# Patient Record
Sex: FEMALE | Race: WHITE | NOT HISPANIC OR LATINO | Employment: UNEMPLOYED | URBAN - METROPOLITAN AREA
[De-identification: names, ages, dates, MRNs, and addresses within clinical notes are randomized per-mention and may not be internally consistent; named-entity substitution may affect disease eponyms.]

---

## 2017-11-01 ENCOUNTER — OFFICE VISIT (OUTPATIENT)
Dept: FAMILY MEDICINE | Facility: CLINIC | Age: 42
End: 2017-11-01
Payer: MEDICARE

## 2017-11-01 ENCOUNTER — TELEPHONE (OUTPATIENT)
Dept: FAMILY MEDICINE | Facility: CLINIC | Age: 42
End: 2017-11-01

## 2017-11-01 VITALS
RESPIRATION RATE: 18 BRPM | OXYGEN SATURATION: 98 % | HEIGHT: 72 IN | SYSTOLIC BLOOD PRESSURE: 144 MMHG | BODY MASS INDEX: 32.94 KG/M2 | HEART RATE: 55 BPM | DIASTOLIC BLOOD PRESSURE: 92 MMHG | WEIGHT: 243.19 LBS

## 2017-11-01 DIAGNOSIS — I10 ESSENTIAL HYPERTENSION: Primary | ICD-10-CM

## 2017-11-01 DIAGNOSIS — E03.4 HYPOTHYROIDISM DUE TO ACQUIRED ATROPHY OF THYROID: ICD-10-CM

## 2017-11-01 DIAGNOSIS — G89.29 OTHER CHRONIC PAIN: ICD-10-CM

## 2017-11-01 DIAGNOSIS — F31.9 BIPOLAR 1 DISORDER: ICD-10-CM

## 2017-11-01 PROCEDURE — 99204 OFFICE O/P NEW MOD 45 MIN: CPT | Mod: S$PBB,,, | Performed by: INTERNAL MEDICINE

## 2017-11-01 PROCEDURE — 99999 PR PBB SHADOW E&M-NEW PATIENT-LVL IV: CPT | Mod: PBBFAC,,, | Performed by: INTERNAL MEDICINE

## 2017-11-01 PROCEDURE — 99204 OFFICE O/P NEW MOD 45 MIN: CPT | Mod: PBBFAC,PO | Performed by: INTERNAL MEDICINE

## 2017-11-01 RX ORDER — IBUPROFEN 200 MG
800 TABLET ORAL 3 TIMES DAILY
COMMUNITY

## 2017-11-01 RX ORDER — OXYCODONE HYDROCHLORIDE 15 MG/1
15 TABLET, FILM COATED, EXTENDED RELEASE ORAL 2 TIMES DAILY PRN
COMMUNITY
End: 2019-01-10

## 2017-11-01 RX ORDER — LOSARTAN POTASSIUM 50 MG/1
50 TABLET ORAL DAILY
Qty: 30 TABLET | Refills: 8 | Status: SHIPPED | OUTPATIENT
Start: 2017-11-01 | End: 2019-01-10

## 2017-11-01 RX ORDER — RISPERIDONE 1 MG/1
1 TABLET ORAL NIGHTLY
Qty: 30 TABLET | Refills: 11 | Status: SHIPPED | OUTPATIENT
Start: 2017-11-01 | End: 2019-01-10

## 2017-11-01 RX ORDER — VENLAFAXINE HYDROCHLORIDE 75 MG/1
75 CAPSULE, EXTENDED RELEASE ORAL DAILY
Qty: 30 CAPSULE | Refills: 11 | Status: SHIPPED | OUTPATIENT
Start: 2017-11-01 | End: 2019-01-10

## 2017-11-01 RX ORDER — LEVOTHYROXINE SODIUM 150 UG/1
150 TABLET ORAL DAILY
Qty: 30 TABLET | Refills: 8 | Status: SHIPPED | OUTPATIENT
Start: 2017-11-01 | End: 2019-01-11

## 2017-11-01 RX ORDER — MULTIVITAMIN
1 TABLET ORAL DAILY
COMMUNITY

## 2017-11-01 NOTE — PROGRESS NOTES
Subjective:       Patient ID: Minerva Sandoval is a 42 y.o. female.    Chief Complaint: Establish Care; Medication Refill; Hypothyroidism; Hypertension; and Depression Screen (25)    New patient out of her meds:    Bipolar 2 - mild uncontrolled depression.  Was managed by Dr Laurence Diez, psychology.  Off meds bc they don't take her insurance.  Needs referral. Was on Effexor 75 mg, resperidone 4 mg - was taking 1 mg bc of hang over next day.     LUIS- hydroxyzine not working.      HTN - uncontrolled; lisinopril 20 mg     Chronic pain - takes occasional 15-30 mg oxycodone.  Takes 12 - 16 ibuprofen per day.  Advised not to take this much.      Hypothyroid - 150 mg levothyroxine. Off her meds.     Out of all her meds for a while.       Review of Systems   Constitutional: Negative for appetite change and fever.   HENT: Negative for nosebleeds and trouble swallowing.    Eyes: Negative for discharge and visual disturbance.   Respiratory: Negative for choking and shortness of breath.    Cardiovascular: Negative for chest pain and palpitations.   Gastrointestinal: Negative for abdominal pain, nausea and vomiting.   Musculoskeletal: Negative for arthralgias and joint swelling.   Skin: Negative for rash and wound.   Neurological: Negative for dizziness and syncope.   Psychiatric/Behavioral: Positive for dysphoric mood. Negative for confusion and suicidal ideas.       Objective:      Vitals:    11/01/17 0958   BP: (!) 144/92   Pulse: (!) 55   Resp: 18     Physical Exam   Constitutional: She appears well-nourished.   Eyes: Conjunctivae and EOM are normal.   Neck: Trachea normal and normal range of motion. No thyromegaly present.   Cardiovascular: Normal rate, regular rhythm and normal heart sounds.    Edema negative   Pulmonary/Chest: Effort normal and breath sounds normal.   Abdominal: Soft. There is no hepatomegaly.   Musculoskeletal:   Normal ROM bilateral    Neurological: She has normal reflexes. No cranial nerve deficit  "(grossly intact).   Skin: Skin is warm, dry and intact.   Psychiatric: She has a normal mood and affect.   Alert and orientated   Vitals reviewed.        Assessment:       1. Essential hypertension    2. Hypothyroidism due to acquired atrophy of thyroid    3. Bipolar 1 disorder    4. Other chronic pain        Plan:       Essential hypertension  -     losartan (COZAAR) 50 MG tablet; Take 1 tablet (50 mg total) by mouth once daily.  Dispense: 30 tablet; Refill: 8  -     Comprehensive metabolic panel; Future; Expected date: 01/01/2018  -     Lipid panel; Future; Expected date: 01/01/2018  -     Comprehensive metabolic panel; Future; Expected date: 06/30/2018    Hypothyroidism due to acquired atrophy of thyroid  -     levothyroxine (SYNTHROID) 150 MCG tablet; Take 1 tablet (150 mcg total) by mouth once daily.  Dispense: 30 tablet; Refill: 8  -     TSH; Future; Expected date: 01/01/2018  -     TSH; Future; Expected date: 06/30/2018    Bipolar 1 disorder  -     Cancel: Ambulatory consult to Psychiatry  -     venlafaxine (EFFEXOR-XR) 75 MG 24 hr capsule; Take 1 capsule (75 mg total) by mouth once daily.  Dispense: 30 capsule; Refill: 11  -     risperiDONE (RISPERDAL) 1 MG tablet; Take 1 tablet (1 mg total) by mouth every evening.  Dispense: 30 tablet; Refill: 11  -     Ambulatory consult to Psychiatry    Other chronic pain  -     Ambulatory consult to Pain Clinic            Counseled on regular exercise, maintenance of a healthy weight, balanced diet rich in fruits/vegetables and lean protein, and avoidance of unhealthy habits like smoking and excessive alcohol intake.   Also, counseled on importance of being compliant with medication, health appointments, diet and exercise.     Return in about 8 months (around 7/1/2018).    "This note will not be shared with the patient."  "

## 2017-12-05 NOTE — PROGRESS NOTES
[facility] New Patient Evaluation    Referred by: Rafael  Reason for referral: Other Chronic Pain    CC:   Chief Complaint   Patient presents with    Back Pain    Neck Pain     HPI: Minerva Sandoval is a pleasant 42 y.o. female who presented to my clinic complaining of low back pain as characterized below.  Chart review is significant for evaluation in the emergency department on 4/3/17 when she was brought in by a deputy concerned for her mental health.  She has an associated history of bipolar 1 disorder and was previously on medications to control it.  She recently established care in the [facility] system with Dr. Armand Hall who is assisting her with the management of hypertension, hypothyroidism, generalized anxiety disorder, and depression.  Dr. Hall referred her to psychiatry which is pending.    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      Previous Therapies:  PT: Denies  HEP: Denies  TENS: Yes, didn't help  Injections:    - Lumbar RFA March 2017 - 20% relief  Surgery: None on neck or back  Medications:    - NSAIDS: Currently taking Ibuprofen   - MSK Relaxants: Flexeril   - TCAs:    - SNRIs: Currently taking venlafaxine   - Topicals:    - Anticonvulsants: Gabapentin - helped nerve pain in leg but not back and also made her tired   - Opioids: previously on Oxycontin 15 BID    Current Pain Medications:  1. Venlafaxine XR 75 mg  daily  2. Ibuprofen 800 TID     Full Medication List:    Current Outpatient Prescriptions:     CALCIUM CARBONATE (CALCIUM 300 ORAL), Take 1 tablet by mouth once daily. Pt does not know dose, Disp: , Rfl:     ERGOCALCIFEROL, VITAMIN D2, (VITAMIN D ORAL), Take 1 capsule by mouth once daily. Pt is unsure of dose., Disp: , Rfl:     ibuprofen (ADVIL,MOTRIN) 200 MG tablet, Take 800 mg by mouth 3 (three) times daily., Disp: , Rfl:     levothyroxine (SYNTHROID) 150 MCG tablet, Take 1 tablet (150 mcg total) by mouth once daily., Disp: 30 tablet, Rfl: 8    losartan (COZAAR) 50 MG tablet, Take 1 tablet (50 mg total) by mouth once daily., Disp: 30 tablet, Rfl: 8    multivitamin (ONE DAILY MULTIVITAMIN) per tablet, Take 1 tablet by mouth once daily., Disp: , Rfl:     oxyCODONE (OXYCONTIN) 15 mg TR12 12 hr tablet, Take 15 mg by mouth 2 (two) times daily as needed., Disp: , Rfl:     venlafaxine (EFFEXOR-XR) 75 MG 24 hr capsule, Take 1 capsule (75 mg total) by mouth once daily., Disp: 30 capsule, Rfl: 11    risperiDONE (RISPERDAL) 1 MG tablet, Take 1 tablet (1 mg total) by mouth every evening., Disp: 30 tablet, Rfl: 11     Review of Systems:  Review of Systems   Constitutional: Negative for chills and fever.   HENT: Negative for nosebleeds.    Eyes: Negative for pain.   Respiratory: Negative for hemoptysis.    Cardiovascular: Negative for chest pain.   Gastrointestinal: Negative for nausea and vomiting.   Genitourinary: Negative for dysuria.   Musculoskeletal: Positive for back pain and neck pain.   Skin: Negative for rash.   Endo/Heme/Allergies: Does not bruise/bleed easily.       Allergies:  Pineapple     Medical History:  Past Medical History:   Diagnosis Date    Bipolar 1 disorder     Depression     Hypertension     Thyroid disease         Surgical History:  Past Surgical History:   Procedure Laterality Date     SECTION      EYE SURGERY          Social History:  Social History     Social History     "Marital status:      Spouse name: N/A    Number of children: N/A    Years of education: N/A     Occupational History    Not on file.     Social History Main Topics    Smoking status: Current Every Day Smoker    Smokeless tobacco: Never Used    Alcohol use No    Drug use: Unknown    Sexual activity: Not on file     Other Topics Concern    Not on file     Social History Narrative    No narrative on file       Physical Exam:  Vitals:    12/06/17 1301   BP: (!) 150/90   Pulse: 78   Weight: 104.7 kg (230 lb 13.2 oz)   Height: 6' 4" (1.93 m)   PainSc:   6   PainLoc: Generalized     General    Nursing note and vitals reviewed.  Constitutional: She is oriented to person, place, and time. She appears well-developed and well-nourished. No distress.   HENT:   Head: Normocephalic and atraumatic.   Nose: Nose normal.   Eyes: Conjunctivae and EOM are normal. Pupils are equal, round, and reactive to light. Right eye exhibits no discharge. Left eye exhibits no discharge. No scleral icterus.   Neck: No JVD present.   Cardiovascular: Intact distal pulses.    Pulmonary/Chest: Effort normal. No respiratory distress.   Abdominal: She exhibits no distension.   Neurological: She is alert and oriented to person, place, and time. Coordination normal.   Psychiatric: She has a normal mood and affect. Her behavior is normal. Judgment and thought content normal.     General Musculoskeletal Exam   Gait: normal         Right Hip Exam     Tests   Pain w/ forced internal rotation (SHERYL): absent  Left Hip Exam     Tests   Pain w/ forced internal rotation (SHERYL): present      Back (L-Spine & T-Spine) / Neck (C-Spine) Exam     Tenderness Right paramedian tenderness of the Lower C-Spine. Left paramedian tenderness of the Lower C-Spine.     Back (L-Spine & T-Spine) Range of Motion   Extension: normal   Flexion: abnormal Back flexion: disc loading positive for pain.   Lateral Bend Right: normal   Lateral Bend Left: normal   Rotation " Right: normal   Rotation Left: normal     Neck (C-Spine) Range of Motion   Flexion:     Normal  Extension: Normal      Muscle Strength   Right Upper Extremity   Biceps: /5   Deltoid:  5/5  Triceps:  5/5  Wrist Extension:    Wrist Flexion:    Finger Flexors:  5/5  Finger Extensors:  5/5  Left Upper Extremity  Biceps:    Deltoid:  5/  Triceps:  5/5  Wrist Extension:    Wrist Flexion:    Finger Flexors:  5  Finger Extensors:  5/5  Right Lower Extremity   Hip Flexion: 5   Quadriceps:     Hamstrin/5   Anterior tibial:    Gastrocsoleus:    Left Lower Extremity   Hip Flexion:    Quadriceps:     Hamstrin/5   Anterior tibial:     Gastrocsoleus:        Imaging:  Cervical and lumbar spine MRI   Review of these images shows abnormal straightening of the cervical spine and multilevel facet arthropathy without evidence of degenerative disc disease, central canal stenosis, or neuroforaminal stenosis.  The lumbar spine images are significant for degenerative disc disease most notably at L4-5 and L5-S1.  Broad-based bulges at the after mentioned levels result in moderate neuroforaminal stenosis without effacement of the exiting nerve roots.  There is disc height loss at these levels as well.  There is also bilateral facet disease most notably at L4-5 and L5-S1.    Labs:  BMP  No results found for: NA, K, CL, CO2, BUN, CREATININE, CALCIUM, ANIONGAP, ESTGFRAFRICA, EGFRNONAA  No results found for: ALT, AST, GGT, ALKPHOS, BILITOT     Aware Review:  17 - positive for a single prescription of Norco 10 #10 tablet from a dentist in 2017    Diagnoses & Associated Orders:  Problem List Items Addressed This Visit     Myofascial pain syndrome    DDD (degenerative disc disease), lumbosacral    Spondylosis of cervical region without myelopathy or radiculopathy    Lumbar spondylosis    Sedentary lifestyle    Bipolar 2 disorder        42-year-old female with  cervicalgia due to facet arthropathy and myofascial dysfunction; discogenic clbp; and bipolar disorder.  She lives a sedentary lifestyle and denies ability to attend PT at this time due to transportation.  Her BPD has not been treated for the last several years with exception of starting Venlafaxine from her PCP recently.    Recommendations & Interventions:   Procedures: None recommended at this time- she has not completed physical therapy or participated and a home exercise program.   if she is able to demonstrate compliance with a home exercise program she may be a candidate for cervical MBB/RFA and lumbar spinal cord stimulator trial.    Medications:    - Restart gabapentin 300-600 mg QHS   - Start Trial of Tizanidine 4 mg BID PRN   - I recommend against opioid therapy as she has not exhausted other treatment modalities which carry less risk.   - I informed her that I reserve opioid therapy for patient to have completed and failed other more conservative therapies.  Imaging: Reviewed and none indicated at this time.  PT/OT: I strongly recommended physical therapy to her; however, she declines at this time stating transportation difficulties.  I spent a significant amount of time educating her on the importance of physical therapy for changing the course of her cervical and lumbar disease.  She voiced understanding multiple times.  HEP: I recommend that the patient start a home exercise regimen that includes daily, moderate cardiovascular exercise lasting at least 30 minutes.  This may include yoga, catherine chi, walking, swimming, aqua aerobics, or other exercises that maintain a heart rate of 50-70% of the calculated maximum heart rate.  I also recommend light, daily stretching focused on the neck, low back, gluteal, and thigh muscles.  These recommendations will help to improve function and facilitate weight loss when coupled with appropriate dietary modifications.  Other: I recommend follow up with Psychiatry to  ensure that her relevant disease are appropriately treated.  Follow Up: 4-6 weeks for eval of HEP and meds    Daren Khalil Jr, MD  Interventional Pain Medicine / Anesthesiology    Disclaimer: This note was partly generated using dictation software which may occasionally result in transcription errors.

## 2017-12-06 ENCOUNTER — OFFICE VISIT (OUTPATIENT)
Dept: PAIN MEDICINE | Facility: CLINIC | Age: 42
End: 2017-12-06
Payer: MEDICARE

## 2017-12-06 VITALS
HEIGHT: 72 IN | HEART RATE: 78 BPM | WEIGHT: 230.81 LBS | DIASTOLIC BLOOD PRESSURE: 90 MMHG | SYSTOLIC BLOOD PRESSURE: 150 MMHG | BODY MASS INDEX: 31.26 KG/M2

## 2017-12-06 DIAGNOSIS — Z91.89 SEDENTARY LIFESTYLE: ICD-10-CM

## 2017-12-06 DIAGNOSIS — M79.18 MYOFASCIAL PAIN SYNDROME: ICD-10-CM

## 2017-12-06 DIAGNOSIS — M51.37 DDD (DEGENERATIVE DISC DISEASE), LUMBOSACRAL: ICD-10-CM

## 2017-12-06 DIAGNOSIS — M47.816 LUMBAR SPONDYLOSIS: ICD-10-CM

## 2017-12-06 DIAGNOSIS — M47.812 SPONDYLOSIS OF CERVICAL REGION WITHOUT MYELOPATHY OR RADICULOPATHY: ICD-10-CM

## 2017-12-06 DIAGNOSIS — F31.81 BIPOLAR 2 DISORDER: ICD-10-CM

## 2017-12-06 PROCEDURE — 99999 PR PBB SHADOW E&M-EST. PATIENT-LVL II: CPT | Mod: PBBFAC,,, | Performed by: PAIN MEDICINE

## 2017-12-06 PROCEDURE — 99212 OFFICE O/P EST SF 10 MIN: CPT | Mod: PBBFAC,PO | Performed by: PAIN MEDICINE

## 2017-12-06 PROCEDURE — 99204 OFFICE O/P NEW MOD 45 MIN: CPT | Mod: S$PBB,,, | Performed by: PAIN MEDICINE

## 2017-12-06 RX ORDER — TIZANIDINE 4 MG/1
4 TABLET ORAL 2 TIMES DAILY PRN
Qty: 60 TABLET | Refills: 1 | Status: SHIPPED | OUTPATIENT
Start: 2017-12-06 | End: 2017-12-16

## 2017-12-06 RX ORDER — GABAPENTIN 300 MG/1
300 CAPSULE ORAL 3 TIMES DAILY
Qty: 90 CAPSULE | Refills: 1 | Status: SHIPPED | OUTPATIENT
Start: 2017-12-06 | End: 2018-02-28 | Stop reason: SDUPTHER

## 2017-12-06 NOTE — LETTER
December 6, 2017      Armand Hall MD  [address] [facility] [address] [place] [address]           [facility]  [address] [facility] [address] [place] [address]  Phone: [phone]          Patient: Minerva Sandoval   MR Number: [id number]   YOB: 1975   Date of Visit: 12/6/2017       Dear Dr. Armand Hall:    Thank you for referring Minerva Sandoval to me for evaluation. Attached you will find relevant portions of my assessment and plan of care.    If you have questions, please do not hesitate to call me. I look forward to following Minerva Sandoval along with you.    Sincerely,    Daren Khalil Jr., MD    Enclosure  CC:  No Recipients    If you would like to receive this communication electronically, please contact [email or web address] or [phone] to request more information on [employer] Link access.    For providers and/or their staff who would like to refer a patient to [facility], please contact us through our one-stop-shop provider referral line, [facility] , at [phone].    If you feel you have received this communication in error or would no longer like to receive these types of communications, please e-mail [email or web address]

## 2017-12-19 ENCOUNTER — PATIENT OUTREACH (OUTPATIENT)
Dept: ADMINISTRATIVE | Facility: HOSPITAL | Age: 42
End: 2017-12-19

## 2017-12-19 NOTE — LETTER
December 19, 2017    Minerva Sandoval  [address] [facility] [id number] [facility]  [address]  [facility] [address]  Phone: [phone] Dear Ms. Sandoval:    [facility] is committed to your overall health.  To help you get the most out of each of your visits, we will review your information to make sure you are up to date on all of your recommended tests and/or procedures.      Audrey Escamilla   has found that you may be due for:    Cholesterol check (Lipid Panel)  Tetanus immunization  Pap smear with HPV Cotest  Mammogram  Influenza vaccine    If you have had any of the above done at another facility, please bring the records or information with you so that your record at [facility] will be complete.     If you are currently taking medication, please bring it with you to your appointment for review.    If you have any questions or concerns, please don't hesitate to call.    Sincerely,  Jessica Welch  Clinical Care Coordinator  [facility] [address] [place] [address]  Phone: [phone]   Fax: [phone]

## 2017-12-19 NOTE — PROGRESS NOTES
Health Maintenance Due   Topic Date Due    Lipid Panel  1975    TETANUS VACCINE  01/14/1993    Pneumococcal PPSV23 (Medium Risk) (1) 01/14/1993    Pap Smear with HPV Cotest  01/14/1996    Mammogram  01/14/2015    Influenza Vaccine  08/01/2017     Pre-visit outreach via mail

## 2018-01-26 RX ORDER — TIZANIDINE HYDROCHLORIDE 4 MG/1
4 CAPSULE, GELATIN COATED ORAL 2 TIMES DAILY
Qty: 270 CAPSULE | Refills: 1 | Status: SHIPPED | OUTPATIENT
Start: 2018-01-26 | End: 2018-04-26

## 2018-02-28 RX ORDER — GABAPENTIN 300 MG/1
300 CAPSULE ORAL 3 TIMES DAILY
Qty: 90 CAPSULE | Refills: 1 | Status: SHIPPED | OUTPATIENT
Start: 2018-02-28 | End: 2018-04-24 | Stop reason: SDUPTHER

## 2018-04-24 RX ORDER — GABAPENTIN 300 MG/1
300 CAPSULE ORAL 3 TIMES DAILY
Qty: 90 CAPSULE | Refills: 1 | Status: SHIPPED | OUTPATIENT
Start: 2018-04-24 | End: 2018-06-29 | Stop reason: SDUPTHER

## 2018-06-14 DIAGNOSIS — Z12.39 BREAST CANCER SCREENING: ICD-10-CM

## 2018-06-18 ENCOUNTER — PATIENT OUTREACH (OUTPATIENT)
Dept: ADMINISTRATIVE | Facility: HOSPITAL | Age: 43
End: 2018-06-18

## 2018-06-18 NOTE — PROGRESS NOTES
Health Maintenance Due   Topic Date Due    Lipid Panel  1975    TETANUS VACCINE  01/14/1993    Pneumococcal PPSV23 (Medium Risk) (1) 01/14/1993    Pap Smear with HPV Cotest  01/14/1996    Mammogram  01/14/2015     Pre-visit outreach via mail

## 2018-06-18 NOTE — LETTER
June 18, 2018    Minerva Sandoval  [address] [address] [facility]  [address]  [facility] [address]  Phone: [phone] Dear Ms. Sandoval:    [facility] is committed to your overall health.  To help you get the most out of each of your visits, we will review your information to make sure you are up to date on all of your recommended tests and/or procedures.      Dr. Hall    has found that your chart shows you may be due for the following:    Tetanus Immunization  Pneumonia immunization  Pap smear  Mammogram    REMINDER: lab appointment  6/25/18    If you have had any of the above done at another facility, please bring the records or information with you so that your record at [facility] will be complete.  If you would like to schedule any of these, please contact me.    If you are currently taking medication, please bring it with you to your appointment for review.      If you have any questions or concerns, please don't hesitate to call.    Sincerely,    Jessica Welch  Clinical Care Coordinator  [facility] [address] [place] [address]  Phone: [phone]   Fax: [phone]

## 2018-06-29 RX ORDER — GABAPENTIN 300 MG/1
300 CAPSULE ORAL 3 TIMES DAILY
Qty: 90 CAPSULE | Refills: 1 | Status: SHIPPED | OUTPATIENT
Start: 2018-06-29 | End: 2018-08-27 | Stop reason: SDUPTHER

## 2018-08-27 RX ORDER — GABAPENTIN 300 MG/1
300 CAPSULE ORAL 3 TIMES DAILY
Qty: 90 CAPSULE | Refills: 1 | Status: SHIPPED | OUTPATIENT
Start: 2018-08-27

## 2018-10-08 ENCOUNTER — TELEPHONE (OUTPATIENT)
Dept: ENDOCRINOLOGY | Facility: CLINIC | Age: 43
End: 2018-10-08

## 2018-10-08 NOTE — TELEPHONE ENCOUNTER
Spoke to pt, she thought she was coming for thyroid biopsy tomorrow. Adv her this appt is a consult so no biopsy will be done at this appt, voiced understanding.

## 2018-10-08 NOTE — TELEPHONE ENCOUNTER
----- Message from Meenakshi Leger sent at 10/8/2018 10:28 AM CDT -----  Type: Needs Medical Advice    Who Called:  Self   Symptoms (please be specific): NA   How long has patient had these symptoms:  GOMEZ   Pharmacy name and phone #:  [facility]   Best Call Back Number: [phone]  Additional Information: Patient called asking to speak with the nurse to discuss what to do prior to seeing the doctor tomorrow. Patient asking if she should take medication prior to seeing the doctor.

## 2018-11-02 ENCOUNTER — TELEPHONE (OUTPATIENT)
Dept: PAIN MEDICINE | Facility: CLINIC | Age: 43
End: 2018-11-02

## 2018-12-28 DIAGNOSIS — F31.9 BIPOLAR 1 DISORDER: ICD-10-CM

## 2019-01-02 RX ORDER — RISPERIDONE 1 MG/1
TABLET ORAL
Qty: 30 TABLET | Refills: 0 | OUTPATIENT
Start: 2019-01-02

## 2019-01-02 NOTE — TELEPHONE ENCOUNTER
Dr. Hall out of clinic. Pt has not been seen since 11/2017. Called pt, no answer.  Left message to call and make appt and med not refilled.   Unable to remove med request.

## 2019-01-10 ENCOUNTER — LAB VISIT (OUTPATIENT)
Dept: LAB | Facility: HOSPITAL | Age: 44
End: 2019-01-10
Attending: INTERNAL MEDICINE
Payer: MEDICARE

## 2019-01-10 ENCOUNTER — OFFICE VISIT (OUTPATIENT)
Dept: ENDOCRINOLOGY | Facility: CLINIC | Age: 44
End: 2019-01-10
Payer: MEDICARE

## 2019-01-10 VITALS
HEIGHT: 72 IN | HEART RATE: 61 BPM | SYSTOLIC BLOOD PRESSURE: 150 MMHG | DIASTOLIC BLOOD PRESSURE: 90 MMHG | WEIGHT: 289.81 LBS | BODY MASS INDEX: 39.25 KG/M2

## 2019-01-10 DIAGNOSIS — E07.9 THYROID DISEASE: ICD-10-CM

## 2019-01-10 DIAGNOSIS — R13.10 DYSPHAGIA, UNSPECIFIED TYPE: ICD-10-CM

## 2019-01-10 DIAGNOSIS — E03.9 HYPOTHYROIDISM, UNSPECIFIED TYPE: ICD-10-CM

## 2019-01-10 DIAGNOSIS — E04.2 MULTINODULAR GOITER: Primary | ICD-10-CM

## 2019-01-10 DIAGNOSIS — E04.2 MULTINODULAR GOITER: ICD-10-CM

## 2019-01-10 LAB
ALBUMIN SERPL BCP-MCNC: 3.8 G/DL
ALP SERPL-CCNC: 91 U/L
ALT SERPL W/O P-5'-P-CCNC: 42 U/L
ANION GAP SERPL CALC-SCNC: 8 MMOL/L
AST SERPL-CCNC: 39 U/L
BILIRUB SERPL-MCNC: 0.4 MG/DL
BUN SERPL-MCNC: 7 MG/DL
CALCIUM SERPL-MCNC: 9.7 MG/DL
CHLORIDE SERPL-SCNC: 100 MMOL/L
CO2 SERPL-SCNC: 29 MMOL/L
CREAT SERPL-MCNC: 0.8 MG/DL
EST. GFR  (AFRICAN AMERICAN): >60 ML/MIN/1.73 M^2
EST. GFR  (NON AFRICAN AMERICAN): >60 ML/MIN/1.73 M^2
GLUCOSE SERPL-MCNC: 107 MG/DL
HCG SERPL QL: NEGATIVE
POTASSIUM SERPL-SCNC: 3.9 MMOL/L
PROT SERPL-MCNC: 9.3 G/DL
SODIUM SERPL-SCNC: 137 MMOL/L
T4 FREE SERPL-MCNC: 0.68 NG/DL
TSH SERPL DL<=0.005 MIU/L-ACNC: 13.95 UIU/ML

## 2019-01-10 PROCEDURE — 99204 PR OFFICE/OUTPT VISIT, NEW, LEVL IV, 45-59 MIN: ICD-10-PCS | Mod: S$PBB,,, | Performed by: INTERNAL MEDICINE

## 2019-01-10 PROCEDURE — 84443 ASSAY THYROID STIM HORMONE: CPT

## 2019-01-10 PROCEDURE — 36415 COLL VENOUS BLD VENIPUNCTURE: CPT | Mod: PO

## 2019-01-10 PROCEDURE — 84703 CHORIONIC GONADOTROPIN ASSAY: CPT | Mod: PO

## 2019-01-10 PROCEDURE — 99999 PR PBB SHADOW E&M-EST. PATIENT-LVL III: CPT | Mod: PBBFAC,,, | Performed by: INTERNAL MEDICINE

## 2019-01-10 PROCEDURE — 99999 PR PBB SHADOW E&M-EST. PATIENT-LVL III: ICD-10-PCS | Mod: PBBFAC,,, | Performed by: INTERNAL MEDICINE

## 2019-01-10 PROCEDURE — 80053 COMPREHEN METABOLIC PANEL: CPT

## 2019-01-10 PROCEDURE — 84439 ASSAY OF FREE THYROXINE: CPT

## 2019-01-10 PROCEDURE — 99213 OFFICE O/P EST LOW 20 MIN: CPT | Mod: PBBFAC,PO | Performed by: INTERNAL MEDICINE

## 2019-01-10 PROCEDURE — 99204 OFFICE O/P NEW MOD 45 MIN: CPT | Mod: S$PBB,,, | Performed by: INTERNAL MEDICINE

## 2019-01-10 RX ORDER — TIZANIDINE 4 MG/1
4 TABLET ORAL 2 TIMES DAILY
COMMUNITY

## 2019-01-10 NOTE — LETTER
January 10, 2019      Ross Cline MD  [address] [facility] [address]           [facility]  [address] [facility] [address] [place] [address]  Phone: [phone]  Fax: [phone]          Patient: Minerva Sandoval   MR Number: [id number]   YOB: 1975   Date of Visit: 1/10/2019       Dear Dr. Ross Cline:    Thank you for referring Minerva Sandoval to me for evaluation. Attached you will find relevant portions of my assessment and plan of care.    If you have questions, please do not hesitate to call me. I look forward to following Minerva Sandoval along with you.    Sincerely,    Mani Scott,     Enclosure  CC:  No Recipients    If you would like to receive this communication electronically, please contact [email or web address] or [phone] to request more information on [employer] Link access.    For providers and/or their staff who would like to refer a patient to [facility], please contact us through our one-stop-shop provider referral line, [facility], at [phone].    If you feel you have received this communication in error or would no longer like to receive these types of communications, please e-mail [email or web address]

## 2019-01-10 NOTE — PROGRESS NOTES
CHIEF COMPLAINT: Multinodular Goiter  43 year old being seen as a new patient.States found to have nodules approx 5 years ago. States that she had an FNA several years ago. On synthroid 150 mcg daily. No XRT to head/neck. States has difficulty swallowing. No Palpitations. No tremors. States weight gained after starting methadone.         PAST MEDICAL HISTORY/PAST SURGICAL HISTORY:  Reviewed in [facility]    SOCIAL HISTORY: No alcohol. Smokes 1ppd.     FAMILY HISTORY:  No Known thyroid disease. No known thyroid cancer. + Colon Ca. No DM.     MEDICATIONS/ALLERGIES: The patient's MedCard has been updated and reviewed.      ROS:   Constitutional: weight increased. Drinking a large caramel Frappe in clinic today. States drinks one daily.   Eyes: No recent visual changes  ENT: + dysphagia  Cardiovascular: Denies current anginal symptoms  Respiratory: Denies current respiratory difficulty  Gastrointestinal: Denies recent bowel disturbances  GenitoUrinary - No dysuria  Skin: No new skin rash  Neurologic: No focal neurologic complaints  Remainder ROS negative        PE:    GENERAL: Well developed, well nourished.  PSYCH:  appropriate mood and affect  EYES:  PERRL, EOM intact.  ENT: Nares patent, oropharynx clear, mucosa pink,   NECK: Supple, trachea midline, right nodule palpable.   CHEST: Resp even and unlabored, CTA bilateral.  CARDIAC: RRR, S1, S2 heard, no murmurs, rubs, S3, or S4  ABDOMEN: Soft, non-tender, non-distended;  No organomegaly  VASCULAR:  DP pulses +2/4 bilaterally, no edema  NEURO: Gait steady, CN II-VII grossly intact  SKIN: No areas of breakdown, no acanthosis nigracans.    LABS     US SOFT TISSUE HEAD NECK THYROID    CLINICAL HISTORY:  Hypothyroidism, unspecified    TECHNIQUE:  Ultrasound of the thyroid and cervical lymph nodes was performed.    COMPARISON:  None.    FINDINGS:  The right thyroid lobe measures 2.6 x 6.6 x 2.7 cm.  The left thyroid lobe measures 6.0 x 2.4 x 2.3 cm.  The isthmus measures 6 mm in  thickness..  Five solid nodules are measured on the right, the largest 2 nodules measure 2.8 x 2.1 x 1.8 cm and 2.8 x 1.1 x 2.5 cm.  One of the nodules partially extends into the isthmus.  There is a single discrete nodule on the left side superiorly measuring 1.0 cm.      Impression       Multiple solid nodules which meet imaging criteria for fine needle aspiration         ASSESSMENT/PLAN:  1. Multinodular Goiter- appears she has had an FNA in the past at [facility]. Will try to obtain records of US and prior FNA to compare. If no changes can repeat US in 1 yr    2. Dysphagia- will get CT neck to see if thyroid causing any obstruction. If not can see GI for dysphagia eval    3. Hypothyroid- check TSH to see if contributing to weight gain. Discussed that she is getting a large amount of sugar daily with these large [employer] Frappe drinks.      FOLLOWUP  Records of Thyroid US, FNA done at [facility]- possible several years ago.   Check TSH, HCG, CMP  CT NECK- Multinodular Goiter and Dysphagia

## 2019-01-11 ENCOUNTER — TELEPHONE (OUTPATIENT)
Dept: ENDOCRINOLOGY | Facility: CLINIC | Age: 44
End: 2019-01-11

## 2019-01-11 DIAGNOSIS — E03.4 HYPOTHYROIDISM DUE TO ACQUIRED ATROPHY OF THYROID: ICD-10-CM

## 2019-01-11 RX ORDER — LEVOTHYROXINE SODIUM 175 UG/1
150 TABLET ORAL DAILY
Qty: 30 TABLET | Refills: 8 | Status: SHIPPED | OUTPATIENT
Start: 2019-01-11 | End: 2019-03-05

## 2019-01-11 NOTE — TELEPHONE ENCOUNTER
Let her know that needs more synthroid. Increase from 150 mcg daily to 175 mcg daily.   Check TSH 6 weeks.

## 2019-01-17 ENCOUNTER — TELEPHONE (OUTPATIENT)
Dept: ENDOCRINOLOGY | Facility: CLINIC | Age: 44
End: 2019-01-17

## 2019-01-17 NOTE — TELEPHONE ENCOUNTER
Left message with pt that [facility] does not have records for Thyroid FNA  Asked pt to try to locate facility that FNA was done at and have faxed to us

## 2019-03-01 ENCOUNTER — LAB VISIT (OUTPATIENT)
Dept: LAB | Facility: HOSPITAL | Age: 44
End: 2019-03-01
Attending: INTERNAL MEDICINE
Payer: MEDICARE

## 2019-03-01 DIAGNOSIS — E03.4 HYPOTHYROIDISM DUE TO ACQUIRED ATROPHY OF THYROID: ICD-10-CM

## 2019-03-01 LAB
T4 FREE SERPL-MCNC: 0.86 NG/DL
TSH SERPL DL<=0.005 MIU/L-ACNC: 7.73 UIU/ML

## 2019-03-01 PROCEDURE — 84439 ASSAY OF FREE THYROXINE: CPT

## 2019-03-01 PROCEDURE — 36415 COLL VENOUS BLD VENIPUNCTURE: CPT | Mod: PO

## 2019-03-01 PROCEDURE — 84443 ASSAY THYROID STIM HORMONE: CPT

## 2019-03-05 ENCOUNTER — TELEPHONE (OUTPATIENT)
Dept: ENDOCRINOLOGY | Facility: CLINIC | Age: 44
End: 2019-03-05

## 2019-03-05 DIAGNOSIS — E03.9 HYPOTHYROIDISM, UNSPECIFIED TYPE: Primary | ICD-10-CM

## 2019-03-05 RX ORDER — LEVOTHYROXINE SODIUM 200 UG/1
200 TABLET ORAL DAILY
Qty: 30 TABLET | Refills: 11 | Status: SHIPPED | OUTPATIENT
Start: 2019-03-05 | End: 2020-03-04

## 2019-03-07 NOTE — TELEPHONE ENCOUNTER
Spoke to pt and adv of Dr Scott's previous message, voiced understanding. Scheduled 6wk labs, adv date and location.

## 2019-08-11 DIAGNOSIS — E03.4 HYPOTHYROIDISM DUE TO ACQUIRED ATROPHY OF THYROID: ICD-10-CM

## 2019-08-12 RX ORDER — LEVOTHYROXINE SODIUM 175 UG/1
TABLET ORAL
Qty: 30 TABLET | Refills: 6 | Status: SHIPPED | OUTPATIENT
Start: 2019-08-12

## 2019-08-16 DIAGNOSIS — Z12.39 BREAST CANCER SCREENING: ICD-10-CM

## 2019-08-23 ENCOUNTER — PATIENT OUTREACH (OUTPATIENT)
Dept: ADMINISTRATIVE | Facility: HOSPITAL | Age: 44
End: 2019-08-23

## 2019-08-23 NOTE — LETTER
September 3, 2019    Minerva Sandoval  [address] [facility] [address]             [facility]  [address]  [facility] [address]  Phone: [phone] Dear Mrs. Sandoval:    We have tried to reach you by mychart unsuccessfully.    [facility] is committed to your overall health. Currently, we have Armand Hall MD listed as your primary care provider. If this is incorrect, please let us know by emailing or contacting our office at [phone] so we can update our records.     Our records show you have not been seen in twelve months by your primary care provider. Please schedule online through your [email or web address]  account or call our office at [phone].   You may also be due for the following test and/or procedures:     Fasting cholesterol labs (Lipid Panel)   TETANUS VACCINE   Pneumococcal Vaccine   Pap Smear   Mammogram, order placed     If you already have any of the above scheduled, please disregard this message.  If you have had any of the above done at a non-[facility] facility, please notify us so that we can obtain a copy or bring a copy to your next Primary Care visit.     Your [facility] care team is committed to supporting your overall health. We look forward to seeing you soon and appreciate the opportunity to serve you.       Sincerely,     Armand Hall MD and your [facility]         If you have any questions or concerns, please don't hesitate to call.

## 2019-09-04 ENCOUNTER — TELEPHONE (OUTPATIENT)
Dept: FAMILY MEDICINE | Facility: CLINIC | Age: 44
End: 2019-09-04

## 2020-07-17 DIAGNOSIS — Z71.89 COMPLEX CARE COORDINATION: ICD-10-CM

## 2020-10-05 ENCOUNTER — PATIENT MESSAGE (OUTPATIENT)
Dept: ADMINISTRATIVE | Facility: HOSPITAL | Age: 45
End: 2020-10-05

## 2021-05-06 ENCOUNTER — PATIENT MESSAGE (OUTPATIENT)
Dept: RESEARCH | Facility: HOSPITAL | Age: 46
End: 2021-05-06